# Patient Record
Sex: MALE | Race: BLACK OR AFRICAN AMERICAN
[De-identification: names, ages, dates, MRNs, and addresses within clinical notes are randomized per-mention and may not be internally consistent; named-entity substitution may affect disease eponyms.]

---

## 2017-01-16 ENCOUNTER — HOSPITAL ENCOUNTER (EMERGENCY)
Dept: HOSPITAL 10 - E/R | Age: 77
Discharge: HOME | End: 2017-01-16
Payer: MEDICARE

## 2017-01-16 VITALS
BODY MASS INDEX: 28.41 KG/M2 | WEIGHT: 198.42 LBS | HEIGHT: 70 IN | BODY MASS INDEX: 28.41 KG/M2 | HEIGHT: 70 IN | WEIGHT: 198.42 LBS

## 2017-01-16 VITALS
TEMPERATURE: 98.6 F | HEART RATE: 77 BPM | DIASTOLIC BLOOD PRESSURE: 79 MMHG | RESPIRATION RATE: 19 BRPM | SYSTOLIC BLOOD PRESSURE: 125 MMHG

## 2017-01-16 DIAGNOSIS — M54.6: Primary | ICD-10-CM

## 2017-01-16 DIAGNOSIS — Z99.2: ICD-10-CM

## 2017-01-16 DIAGNOSIS — E03.9: ICD-10-CM

## 2017-01-16 DIAGNOSIS — N18.9: ICD-10-CM

## 2017-01-16 DIAGNOSIS — D64.9: ICD-10-CM

## 2017-01-16 DIAGNOSIS — J20.9: ICD-10-CM

## 2017-01-16 DIAGNOSIS — Z79.82: ICD-10-CM

## 2017-01-16 DIAGNOSIS — E11.9: ICD-10-CM

## 2017-01-16 DIAGNOSIS — Z79.84: ICD-10-CM

## 2017-01-16 LAB
ANION GAP SERPL CALC-SCNC: 17 MMOL/L
BASOPHILS # BLD AUTO: 0 10^3/UL
BASOPHILS NFR BLD: 0.7 %
BUN SERPL-MCNC: 58 MG/DL
CALCIUM SERPL-MCNC: 6.8 MG/DL
CHLORIDE SERPL-SCNC: 97 MMOL/L
CO2 SERPL-SCNC: 31 MMOL/L
CONDITION: 1
CREAT SERPL-MCNC: 10.78 MG/DL
EOSINOPHIL # BLD: 0.2 10^3/UL
EOSINOPHIL NFR BLD: 2.3 %
ERYTHROCYTE [DISTWIDTH] IN BLOOD BY AUTOMATED COUNT: 15.6 %
GLUCOSE SERPL-MCNC: 112 MG/DL
HCT VFR BLD CALC: 32.8 %
HGB BLD-MCNC: 11.1 G/DL
LH ANALYZER COMMENTS: 1
LYMPHOCYTES # BLD AUTO: 1.9 10^3/UL
LYMPHOCYTES NFR BLD AUTO: 25.8 %
MCH RBC QN AUTO: 29.7 PG
MCHC RBC AUTO-ENTMCNC: 33.9 G/DL
MCV RBC AUTO: 87.5 FL
MONOCYTES # BLD: 0.4 10^3/UL
MONOCYTES NFR BLD: 5.9 %
NEUTROPHILS # BLD: 4.8 10^3/UL
NEUTROPHILS NFR BLD AUTO: 65.3 %
NRBC # BLD MANUAL: 0 10^3/UL
NRBC BLD QL: 0 /100WBC
PLATELET # BLD: 203 10^3/UL
PMV BLD AUTO: 8.3 FL
POTASSIUM SERPL-SCNC: 3.7 MMOL/L
RBC # BLD AUTO: 3.75 10^6/UL
SODIUM SERPL-SCNC: 141 MMOL/L
WBC # BLD AUTO: 7.4 10^3/UL
WBC # BLD: 7.4 10^3/UL

## 2017-01-16 PROCEDURE — 80048 BASIC METABOLIC PNL TOTAL CA: CPT

## 2017-01-16 PROCEDURE — 85025 COMPLETE CBC W/AUTO DIFF WBC: CPT

## 2017-01-16 PROCEDURE — 93005 ELECTROCARDIOGRAM TRACING: CPT

## 2017-01-16 PROCEDURE — 71010: CPT

## 2017-01-16 NOTE — ERA
ER Documentation


Chief Complaint


Date/Time


DATE: 17 


TIME: 15:20


Chief Complaint


UPPER BACK PAIN, COUGH CONGESTION DIALYSIS PT  UNABLE TO SLEEP X2 DAYS





HPI


The patient is a 76-year-old male, presenting to the ER because of about back 

pain due to coughing for the last week.  He has been getting over a cold, 

complains of intermittent dry cough for the last 2 weeks.  The back pain is 

worse with coughing.  He denies fever, chills, neck pain, chest pain, dyspnea, 

abdominal pain, vomiting, dysuria, diarrhea.  He does not smoke, drink





Past medical history: Chronic kidney disease on peritoneal dialysis, 

hypothyroidism, diabetes mellitus, anemia


Past surgical history: Appendectomy, cardiac angiogram, peritoneal dialysis 

catheter





ROS


All systems reviewed and are negative except as per history of present illness.





Medications


Home Meds


Active Scripts


Dextromethorphan Hb-Promethazine Hcl (Promethazine DM Syrup) 473 Ml Syrup, 10 

ML PO Q6H Y for COUGH, #4 OZ


   Prov:GREG DUMONT MD         17


Hydrocodone/Acetaminophen (Norco 5-325 Tablet) 1 Each Tablet, 1 TAB PO Q6H Y 

for PAIN, #7 TAB


   Prov:GREG DUMONT MD         17


Azithromycin* (Zithromax*) 250 Mg Tablet, 250 MG PO .ZPACK AS DIRECTED, #6 TAB


   TAKE 500 MG (2 TABS) THE FIRST DAY THEN 250 MG (1 TAB) DAYS 2-5


   Prov:GREG DUMONT MD         17


Reported Medications


[Magnesium ]   No Conflict Check, 250 MG PO DAILY


   17


Aspirin* (Aspirin* EC) 81 Mg Tablet.dr, 81 MG PO DAILY, TAB


   17


Metoprolol Tartrate* (Lopressor*) 25 Mg Tab, 25 MG PO BID, #60 TAB


   17


Docusate Sodium* (Docusate Sodium*) 100 Mg Capsule, 100 MG PO BID, #60 CAP


   17


Ferrous Sulfate* (Ferrous Sulfate*) 325 Mg Tabec, 325 MG PO DAILY, TAB


   17


Omega-3 Fatty Acids/Fish Oil (Fish Oil 1,000 mg Capsule) 1 Each Capsule, 1 EACH 

PO DAILY, CAP


   17


Levothyroxine Sodium* (Levoxyl*) 25 Mcg Tablet, 25 MCG PO BEFORE BREAKFAST, #30 

TAB


   3/10/16


Sevelamer Carbonate* (Renvela*) 800 Mg Tablet, 0.8 GM PO WITH MEALS, TAB


   3/10/16


Clopidogrel Bisulfate (Clopidogrel) 75 Mg Tablet, 75 MG PO DAILY, #30 TAB


   3/10/16


Discontinued Reported Medications


Multivit/Ca Carb/B Cmplx/Fa* (Sri-Michael*) 1 Tab Tab, 1 TAB PO DAILY, TAB


   3/10/16


Calcitriol* (Calcitriol*) 0.5 Mcg Capsule, 0.5 MCG PO DAILY, CAP


   3/10/16


Glipizide XL* (Glipizide XL*) 2.5 Mg Tab.er.24, 2.5 MG PO EVERY OTHER DAY, TAB


   3/10/16





Allergies


Allergies:  


Coded Allergies:  


     penicillin (Verified  Allergy, Unknown, 17)





PMhx/Soc


History of Surgery:  Yes (APPENDICITIS )


Anesthesia Reaction:  No


Hx Substance Use:  No


Hx Tobacco Use:  No





Physical Exam


Vitals





Vital Signs








  Date Time  Temp Pulse Resp B/P Pulse Ox O2 Delivery O2 Flow Rate FiO2


 


17 13:09 97.9 76 20 143/90 99   








Physical Exam


 Const:      No acute distress.


 Head:        Atraumatic.


 Eyes:       Normal Conjunctiva.


 ENT:         Normal External Ears, Nose and Mouth.


 Neck:        Full range of motion.  No meningismus.


 Resp:         Clear to auscultation bilaterally.


 Cardio:       Regular rate and rhythm, no murmurs.


 Abd:         Soft,  non distended, normal bowel sounds, non tender.


 Skin:         No petechiae or rashes.


 Back:        No midline or flank tenderness.


 Ext:          No cyanosis, or edema.


 Neur:        Awake and alert. No focal deficit


 Psych:        Normal Mood and Affect.


Result Diagram:  


17 1551                                                                   

             17 1551





Results 24 hrs





 Laboratory Tests








Test


  17


15:51


 


Anion Gap 17 


 


Basophils # 0.010^3/ul 


 


Basophils % 0.7% 


 


Blood Morphology Comment  


 


Blood Urea Nitrogen 58mg/dl 


 


Calcium Level 6.8mg/dl 


 


Carbon Dioxide Level 31mmol/L 


 


Chloride Level 97mmol/L 


 


Creatinine 10.78mg/dl 


 


Eosinophils # 0.210^3/ul 


 


Eosinophils % 2.3% 


 


Glucose Level 112mg/dl 


 


Hematocrit 32.8% 


 


Hemoglobin 11.1g/dl 


 


Lymphocytes # 1.910^3/ul 


 


Lymphocytes % 25.8% 


 


Mean Corpuscular Hemoglobin 29.7pg 


 


Mean Corpuscular Hemoglobin


Concent 33.9g/dl 


 


 


Mean Corpuscular Volume 87.5fl 


 


Mean Platelet Volume 8.3fl 


 


Monocytes # 0.410^3/ul 


 


Monocytes % 5.9% 


 


Neutrophils # 4.810^3/ul 


 


Neutrophils % 65.3% 


 


Nucleated Red Blood Cells # 0.010^3/ul 


 


Nucleated Red Blood Cells % 0.0/100WBC 


 


Platelet Count 31446^3/UL 


 


Potassium Level 3.7mmol/L 


 


Red Blood Count 3.7510^6/ul 


 


Red Cell Distribution Width 15.6% 


 


Sodium Level 141mmol/L 


 


White Blood Count 7.410^3/ul 








 Current Medications








 Medications


  (Trade)  Dose


 Ordered  Sig/Kt


 Route


 PRN Reason  Start Time


 Stop Time Status Last Admin


Dose Admin


 


 Acetaminophen/


 Hydrocodone Bitart


  (Norco (10/325))  1 tab  ONCE  ONCE


 PO


   17 16:00


 17 16:01 DC 17 16:00


 


 


 Ondansetron HCl


  (Zofran Odt)  4 mg  ONCE  STAT


 ODT


   17 15:31


 17 15:34 DC 17 16:00


 











Procedures/[place] [id number]


 Radiology Main Line: [phone]





 DIAGNOSTIC IMAGING REPORT





 Patient: CLAUDIO BOB   : 1940   Age: 76  Sex: M                     

   


 MR #:    [id number]   Acct #:   [id number]    DOS: 17 1531


 Ordering MD: GREG DUMONT MD   Location:  E/R   Room/Bed:                  

                          


 








PROCEDURE:   XR Chest. 


 


CLINICAL INDICATION:   Chest pain


 


TECHNIQUE:   Single frontal view  of the chest was obtained. 


 


COMPARISON:   03/10/2016 


 


FINDINGS:


The heart is within normal limits.  The thoracic aorta is calcified.


The lungs are clear.


There is no pleural effusion or pneumothorax.   


Again seen is a cardiac monitoring device overlying the left mid chest.


 


RPTAT: AA


 


IMPRESSION:


No acute disease. 


Calcified aorta consistent with atherosclerotic disease.


_____________________________________________ 


.Freddy Orozco MD, MD           Date    Time 


Electronically viewed and signed by .Freddy Orozco MD, MD on 2017 15:

58 


 


D:  2017 15:58  T:  2017 15:58


.S/





CC: GREG DUMONT MD





MEDICAL MAKING DECISION: The patient is a 76-year-old male, presenting with 

acute bronchitis, acute upper back pain most likely due to persistent dry 

cough.  He was treated with Norco 10 million p.o. for pain and Zofran ODT for 

nausea with good response.  The differential diagnoses considered include but 

are not limited to acute coronary syndrome, acute myocardial infarction, 

pericarditis, pulmonary embolism, aortic dissection, pneumonia, pleural effusion

, pneumothorax, GERD, chest wall pain.





Departure


Diagnosis:  


 Primary Impression:  


 Back pain


 Additional Impressions:  


 Bronchitis


 Anemia


Condition:  Stable


Comments


He was discharged with Zithromax, Norco, promethazine with dextromethorphan


I discussed the findings with the patient. I advised the patient to follow-up 

with the primary physician in about 1-2 days, sooner if needed and return if 

any concern.





The patient's blood pressure was elevated (>120/80) but appears stable without 

evidence of hypertension emergency or urgency.  The patient was counseled about 

the risks of hypertension and urged to pursue outpatient monitoring and therapy 

within a week with their primary care physician.











GREG DUMONT MD 2017 15:21

## 2017-01-16 NOTE — RADRPT
PROCEDURE:   XR Chest. 

 

CLINICAL INDICATION:   Chest pain

 

TECHNIQUE:   Single frontal view  of the chest was obtained. 

 

COMPARISON:   03/10/2016 

 

FINDINGS:

The heart is within normal limits.  The thoracic aorta is calcified.

The lungs are clear.

There is no pleural effusion or pneumothorax.   

Again seen is a cardiac monitoring device overlying the left mid chest.

 

RPTAT: AA

 

IMPRESSION:

No acute disease. 

Calcified aorta consistent with atherosclerotic disease.

_____________________________________________ 

.Freddy Orozco MD, MD           Date    Time 

Electronically viewed and signed by .Freddy Orozco MD, MD on 01/16/2017 15:58 

 

D:  01/16/2017 15:58  T:  01/16/2017 15:58

.S/

## 2017-08-07 ENCOUNTER — HOSPITAL ENCOUNTER (EMERGENCY)
Dept: HOSPITAL 10 - E/R | Age: 77
LOS: 1 days | Discharge: HOME | End: 2017-08-08
Payer: MEDICARE

## 2017-08-07 VITALS
BODY MASS INDEX: 26.58 KG/M2 | HEIGHT: 72 IN | HEIGHT: 72 IN | BODY MASS INDEX: 26.58 KG/M2 | WEIGHT: 196.21 LBS | WEIGHT: 196.21 LBS

## 2017-08-07 DIAGNOSIS — K63.1: Primary | ICD-10-CM

## 2017-08-07 DIAGNOSIS — Z79.82: ICD-10-CM

## 2017-08-07 DIAGNOSIS — S29.9XXA: ICD-10-CM

## 2017-08-07 DIAGNOSIS — S20.211A: ICD-10-CM
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
## 2017-08-07 DIAGNOSIS — S70.02XA: ICD-10-CM

## 2017-08-07 LAB
ALBUMIN SERPL-MCNC: 3.7 G/DL (ref 3.3–4.9)
ALBUMIN/GLOB SERPL: 0.97 {RATIO}
ALP SERPL-CCNC: 111 IU/L (ref 42–121)
ALT SERPL-CCNC: 26 IU/L (ref 13–69)
ANION GAP SERPL CALC-SCNC: 21 MMOL/L (ref 8–16)
AST SERPL-CCNC: 22 IU/L (ref 15–46)
BASOPHILS # BLD AUTO: 0.1 10^3/UL (ref 0–0.1)
BASOPHILS NFR BLD: 0.7 % (ref 0–2)
BILIRUB DIRECT SERPL-MCNC: 0 MG/DL (ref 0–0.2)
BILIRUB SERPL-MCNC: 0 MG/DL (ref 0.2–1.3)
BUN SERPL-MCNC: 65 MG/DL (ref 7–20)
CALCIUM SERPL-MCNC: 8 MG/DL (ref 8.4–10.2)
CHLORIDE SERPL-SCNC: 95 MMOL/L (ref 97–110)
CO2 SERPL-SCNC: 29 MMOL/L (ref 21–31)
CREAT SERPL-MCNC: 14.51 MG/DL (ref 0.61–1.24)
EOSINOPHIL # BLD: 0.2 10^3/UL (ref 0–0.5)
EOSINOPHIL NFR BLD: 3.3 % (ref 0–7)
ERYTHROCYTE [DISTWIDTH] IN BLOOD BY AUTOMATED COUNT: 15 % (ref 11.5–14.5)
GLOBULIN SER-MCNC: 3.8 G/DL (ref 1.3–3.2)
GLUCOSE SERPL-MCNC: 105 MG/DL (ref 70–220)
HCT VFR BLD CALC: 32.9 % (ref 42–52)
HGB BLD-MCNC: 10.9 G/DL (ref 14–18)
INR PPP: 0.95
LYMPHOCYTES # BLD AUTO: 2.3 10^3/UL (ref 0.8–2.9)
LYMPHOCYTES NFR BLD AUTO: 32.4 % (ref 15–51)
MCH RBC QN AUTO: 30.4 PG (ref 29–33)
MCHC RBC AUTO-ENTMCNC: 33.1 G/DL (ref 32–37)
MCV RBC AUTO: 91.6 FL (ref 82–101)
MONOCYTES # BLD: 0.5 10^3/UL (ref 0.3–0.9)
MONOCYTES NFR BLD: 7.6 % (ref 0–11)
NEUTROPHILS # BLD: 3.9 10^3/UL (ref 1.6–7.5)
NEUTROPHILS NFR BLD AUTO: 55.7 % (ref 39–77)
NRBC # BLD MANUAL: 0 10^3/UL (ref 0–0)
NRBC BLD AUTO-RTO: 0 /100WBC (ref 0–0)
PLATELET # BLD: 171 10^3/UL (ref 140–415)
PMV BLD AUTO: 10.2 FL (ref 7.4–10.4)
POTASSIUM SERPL-SCNC: 3.6 MMOL/L (ref 3.5–5.1)
PROT SERPL-MCNC: 7.5 G/DL (ref 6.1–8.1)
PROTHROMBIN TIME: 12.7 SEC (ref 12.2–14.2)
PT RATIO: 1
RBC # BLD AUTO: 3.59 10^6/UL (ref 4.7–6.1)
SODIUM SERPL-SCNC: 141 MMOL/L (ref 135–144)
WBC # BLD AUTO: 6.9 10^3/UL (ref 4.8–10.8)

## 2017-08-07 PROCEDURE — 73510: CPT

## 2017-08-07 PROCEDURE — 72125 CT NECK SPINE W/O DYE: CPT

## 2017-08-07 PROCEDURE — 72128 CT CHEST SPINE W/O DYE: CPT

## 2017-08-07 PROCEDURE — 36415 COLL VENOUS BLD VENIPUNCTURE: CPT

## 2017-08-07 PROCEDURE — 96374 THER/PROPH/DIAG INJ IV PUSH: CPT

## 2017-08-07 PROCEDURE — 74177 CT ABD & PELVIS W/CONTRAST: CPT

## 2017-08-07 PROCEDURE — 83605 ASSAY OF LACTIC ACID: CPT

## 2017-08-07 PROCEDURE — 80053 COMPREHEN METABOLIC PANEL: CPT

## 2017-08-07 PROCEDURE — 71100 X-RAY EXAM RIBS UNI 2 VIEWS: CPT

## 2017-08-07 PROCEDURE — 85610 PROTHROMBIN TIME: CPT

## 2017-08-07 PROCEDURE — 96375 TX/PRO/DX INJ NEW DRUG ADDON: CPT

## 2017-08-07 PROCEDURE — 99285 EMERGENCY DEPT VISIT HI MDM: CPT

## 2017-08-07 PROCEDURE — 71020: CPT

## 2017-08-07 PROCEDURE — 85025 COMPLETE CBC W/AUTO DIFF WBC: CPT
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
## 2017-08-08 VITALS — RESPIRATION RATE: 18 BRPM | SYSTOLIC BLOOD PRESSURE: 104 MMHG | DIASTOLIC BLOOD PRESSURE: 70 MMHG | HEART RATE: 78 BPM
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
## 2017-10-01 ENCOUNTER — HOSPITAL ENCOUNTER (EMERGENCY)
Dept: HOSPITAL 10 - E/R | Age: 77
Discharge: HOME | End: 2017-10-01
Payer: COMMERCIAL

## 2017-10-01 VITALS
WEIGHT: 315 LBS | BODY MASS INDEX: 45.1 KG/M2 | HEIGHT: 70 IN | HEIGHT: 70 IN | WEIGHT: 315 LBS | BODY MASS INDEX: 45.1 KG/M2

## 2017-10-01 DIAGNOSIS — E87.6: ICD-10-CM

## 2017-10-01 DIAGNOSIS — Z79.82: ICD-10-CM

## 2017-10-01 DIAGNOSIS — I95.3: Primary | ICD-10-CM

## 2017-10-01 LAB
ANION GAP SERPL CALC-SCNC: 12 MMOL/L (ref 8–16)
BASOPHILS # BLD AUTO: 0 10^3/UL (ref 0–0.1)
BASOPHILS NFR BLD: 0.5 % (ref 0–2)
BUN SERPL-MCNC: 42 MG/DL (ref 7–20)
CALCIUM SERPL-MCNC: 8 MG/DL (ref 8.4–10.2)
CHLORIDE SERPL-SCNC: 98 MMOL/L (ref 97–110)
CO2 SERPL-SCNC: 30 MMOL/L (ref 21–31)
CREAT SERPL-MCNC: 12.49 MG/DL (ref 0.61–1.24)
EOSINOPHIL # BLD: 0.2 10^3/UL (ref 0–0.5)
EOSINOPHIL NFR BLD: 3.1 % (ref 0–7)
ERYTHROCYTE [DISTWIDTH] IN BLOOD BY AUTOMATED COUNT: 14.8 % (ref 11.5–14.5)
GLUCOSE SERPL-MCNC: 140 MG/DL (ref 70–220)
HCT VFR BLD CALC: 37.1 % (ref 42–52)
HGB BLD-MCNC: 11.7 G/DL (ref 14–18)
LYMPHOCYTES # BLD AUTO: 1.8 10^3/UL (ref 0.8–2.9)
LYMPHOCYTES NFR BLD AUTO: 31.3 % (ref 15–51)
MCH RBC QN AUTO: 28.8 PG (ref 29–33)
MCHC RBC AUTO-ENTMCNC: 31.5 G/DL (ref 32–37)
MCV RBC AUTO: 91.4 FL (ref 82–101)
MONOCYTES # BLD: 0.4 10^3/UL (ref 0.3–0.9)
MONOCYTES NFR BLD: 7.7 % (ref 0–11)
NEUTROPHILS # BLD: 3.3 10^3/UL (ref 1.6–7.5)
NEUTROPHILS NFR BLD AUTO: 57.1 % (ref 39–77)
NRBC # BLD MANUAL: 0 10^3/UL (ref 0–0)
NRBC BLD AUTO-RTO: 0 /100WBC (ref 0–0)
PLATELET # BLD: 180 10^3/UL (ref 140–415)
PMV BLD AUTO: 10.3 FL (ref 7.4–10.4)
POTASSIUM SERPL-SCNC: 2.8 MMOL/L (ref 3.5–5.1)
RBC # BLD AUTO: 4.06 10^6/UL (ref 4.7–6.1)
SODIUM SERPL-SCNC: 137 MMOL/L (ref 135–144)
TROPONIN I SERPL-MCNC: 0.05 NG/ML (ref 0–0.12)
WBC # BLD AUTO: 5.8 10^3/UL (ref 4.8–10.8)

## 2017-10-01 PROCEDURE — 99284 EMERGENCY DEPT VISIT MOD MDM: CPT

## 2017-10-01 PROCEDURE — 36415 COLL VENOUS BLD VENIPUNCTURE: CPT

## 2017-10-01 PROCEDURE — 93005 ELECTROCARDIOGRAM TRACING: CPT

## 2017-10-01 PROCEDURE — 85025 COMPLETE CBC W/AUTO DIFF WBC: CPT

## 2017-10-01 PROCEDURE — 80048 BASIC METABOLIC PNL TOTAL CA: CPT

## 2017-10-01 PROCEDURE — 84484 ASSAY OF TROPONIN QUANT: CPT

## 2017-10-01 NOTE — ERD
ER Documentation


Chief Complaint


Date/Time


DATE: 10/1/17 


TIME: 16:45


Chief Complaint


weakness after dialysis





HPI


This is 77-year-old male who does peritoneal dialysis.  The patient says he 

unhooked himself from dialysis this morning and got dressed and went to [facility] 

for breakfast.  He said when he was walking from the car to [facility] he felt a 

little dizzy and it progressively got worse where he felt just generalized 

weakness.  He said he had a lean against the counter to hold himself up and 

that he gently let himself onto the floor.  There is no syncope no chest pain 

no shortness of breath no abdominal pain.  The patient states he has had this 

on a few episodes before.  EMS arrived into the patient's blood pressure was 

low in the upper 90s systolic.  The patient says that he has had some blood 

pressure issues after dialysis in the past.  He says he feels much better and 

wants to go home now.  I did convince him to stay and check a few labs.  He 

also states she has had low potassium in the past





ROS


All systems reviewed and are negative except as per history of present illness.





Medications


Home Meds


Active Scripts


Potassium Chloride* (K-Dur*) 10 Meq Tab.prt.sr, 20 MEQ PO DAILY for 5 Days, #10 

TAB


   Prov:VINCENZO WYATT DO         10/1/17


Hydrocodone/Acetaminophen (Norco  Tablet) 1 Each Tablet, 1 TAB PO Q6H Y 

for PAIN, #20 TAB


   Prov:PAIGE LARSON         8/8/17


Azithromycin* (Zithromax*) 250 Mg Tablet, 250 MG PO .ZPACK AS DIRECTED, #6 TAB


   TAKE 500 MG (2 TABS) THE FIRST DAY THEN 250 MG (1 TAB) DAYS 2-5


   Prov:GREG DUMONT MD         1/16/17


Reported Medications


[Magnesium ]   No Conflict Check, 250 MG PO DAILY


   1/16/17


Aspirin* (Aspirin* EC) 81 Mg Tablet.dr, 81 MG PO DAILY, TAB


   1/16/17


Metoprolol Tartrate* (Lopressor*) 25 Mg Tab, 25 MG PO BID, #60 TAB


   1/16/17


Docusate Sodium* (Docusate Sodium*) 100 Mg Capsule, 100 MG PO BID, #60 CAP


   1/16/17


Ferrous Sulfate* (Ferrous Sulfate*) 325 Mg Tabec, 325 MG PO DAILY, TAB


   1/16/17


Omega-3 Fatty Acids/Fish Oil (Fish Oil 1,000 mg Capsule) 1 Each Capsule, 1 EACH 

PO DAILY, CAP


   1/16/17


Levothyroxine Sodium* (Levoxyl*) 25 Mcg Tablet, 25 MCG PO BEFORE BREAKFAST, #30 

TAB


   3/10/16


Sevelamer Carbonate* (Renvela*) 800 Mg Tablet, 0.8 GM PO WITH MEALS, TAB


   3/10/16


Clopidogrel Bisulfate (Clopidogrel) 75 Mg Tablet, 75 MG PO DAILY, #30 TAB


   3/10/16





Allergies


Allergies:  


Coded Allergies:  


     penicillin (Verified  Allergy, Unknown, 1/16/17)





PMhx/Soc


History of Surgery:  Yes (APPENDICITIS)


Anesthesia Reaction:  No


Hx Miscellaneous Medical Probl:  Yes (PERITONEAL DIALYSIS)


Hx Alcohol Use:  No


Hx Substance Use:  No


Hx Tobacco Use:  No


Smoking Status:  Unknown if ever smoked





FmHx


Family History:  No coronary disease





Physical Exam


Vitals





Vital Signs








  Date Time  Temp Pulse Resp B/P Pulse Ox O2 Delivery O2 Flow Rate FiO2


 


10/1/17 14:50 98.0 78 18 120/71 99   


 


10/1/17 14:50 97.5 81 18 112/70 99   








Physical Exam


Const:      Well-developed, well-nourished


 Head:        Atraumatic, normocephalic


 Eyes:       Normal Conjunctiva, PERRLA, EOMI, normal sclera, no nystagmus


 ENT:         Normal External Ears, Nose and Mouth, moist mucus membranes.


 Neck:        Full range of motion.  No meningismus, no lymphadenopathy.


 Resp:         Clear to auscultation bilaterally, no wheezing, rhonchi, rales


 Cardio:       Regular rate and rhythm, no murmurs, S1 S2 present


 Abd:         Soft, non tender x 4, non distended. Normal bowel sounds, no 

guarding or rebound, no pulsitile abdominal masses or bruits


 Skin:         No petechiae or rashes, no ecchymosis , no maculopapular rash


 Back:        No midline or flank tenderness


 Ext:          No cyanosis, or edema, FROM x 4, normal inspection, 

neurovascularly intact x 4


 Neur:        Awake and alert, STR 5/5 x 4, sensation intact x 4, no focal 

findings, cerebellum intact


 Psych:        Normal Mood and Affect


Result Diagram:  


10/1/17 1450                                                                   

             10/1/17 1450





Results 24 hrs





 Laboratory Tests








Test


  10/1/17


14:50


 


White Blood Count 5.810^3/ul 


 


Red Blood Count 4.0610^6/ul 


 


Hemoglobin 11.7g/dl 


 


Hematocrit 37.1% 


 


Mean Corpuscular Volume 91.4fl 


 


Mean Corpuscular Hemoglobin 28.8pg 


 


Mean Corpuscular Hemoglobin


Concent 31.5g/dl 


 


 


Red Cell Distribution Width 14.8% 


 


Platelet Count 66114^3/UL 


 


Mean Platelet Volume 10.3fl 


 


Neutrophils % 57.1% 


 


Lymphocytes % 31.3% 


 


Monocytes % 7.7% 


 


Eosinophils % 3.1% 


 


Basophils % 0.5% 


 


Nucleated Red Blood Cells % 0.0/100WBC 


 


Neutrophils # 3.310^3/ul 


 


Lymphocytes # 1.810^3/ul 


 


Monocytes # 0.410^3/ul 


 


Eosinophils # 0.210^3/ul 


 


Basophils # 0.010^3/ul 


 


Nucleated Red Blood Cells # 0.010^3/ul 


 


Sodium Level 137mmol/L 


 


Potassium Level 2.8mmol/L 


 


Chloride Level 98mmol/L 


 


Carbon Dioxide Level 30mmol/L 


 


Anion Gap 12 


 


Blood Urea Nitrogen 42mg/dl 


 


Creatinine 12.49mg/dl 


 


Glucose Level 140mg/dl 


 


Calcium Level 8.0mg/dl 


 


Troponin I 0.053ng/ml 








 Current Medications








 Medications


  (Trade)  Dose


 Ordered  Sig/Kt


 Route


 PRN Reason  Start Time


 Stop Time Status Last Admin


Dose Admin


 


 Sodium Chloride


  (NS)  1,000 ml @ 


 1,000 mls/hr  Q1H STAT


 IV


   10/1/17 14:48


 10/1/17 15:47 DC 10/1/17 14:55


 


 


 Potassium Chloride


  (Klor-Con 20)  40 meq  ONCE  STAT


 PO


   10/1/17 16:42


 10/1/17 16:43 DC  


 











Procedures/MDM


EKG: 


Rate/Rhythm:             Normal sinus rhythm with a first-degree AV block


QRS, ST, QT:    NORMAL [place], QRS, QT]


Impression:      [abNORMAL EKG]











She received IV fluids.  The patient's blood pressure is 110 systolic.  The 

patient was walked in the ER and had no problems walking around for the 

dizziness completely asymptomatic.





He received 40 mEq of K-Dur.





I will give him some potassium to go home with.  His potassium should 

reregulate during dialysis.





I feel like his blood pressure was dropped after dialysis related complication 

however is now resolved.  He is feeling better.  We will discharge him home 

with follow-up with his primary





Departure


Diagnosis:  


 Primary Impression:  


 Hypotension


 Hypotension type:  hemodialysis-associated hypotension  Qualified Code:  I95.3 

- Hemodialysis-associated hypotension


 Additional Impressions:  


 Acute weakness


 Hypokalemia


Condition:  Stable


Patient Instructions:  Hypokalemia, Hypotension, All Causes, Weakness, Unk Cause











VINCENZO WYATT DO Oct 1, 2017 16:50

## 2019-06-21 ENCOUNTER — HOSPITAL ENCOUNTER (OUTPATIENT)
Dept: HOSPITAL 91 - RAD | Age: 79
Discharge: HOME | End: 2019-06-21
Payer: COMMERCIAL

## 2019-06-21 ENCOUNTER — HOSPITAL ENCOUNTER (OUTPATIENT)
Dept: HOSPITAL 10 - RAD | Age: 79
Discharge: HOME | End: 2019-06-21
Attending: INTERNAL MEDICINE
Payer: COMMERCIAL

## 2019-06-21 DIAGNOSIS — R93.5: Primary | ICD-10-CM

## 2019-06-21 PROCEDURE — 74018 RADEX ABDOMEN 1 VIEW: CPT

## 2019-07-04 ENCOUNTER — HOSPITAL ENCOUNTER (EMERGENCY)
Dept: HOSPITAL 91 - E/R | Age: 79
LOS: 1 days | Discharge: HOME | End: 2019-07-05
Payer: COMMERCIAL

## 2019-07-04 ENCOUNTER — HOSPITAL ENCOUNTER (EMERGENCY)
Dept: HOSPITAL 10 - E/R | Age: 79
LOS: 1 days | Discharge: HOME | End: 2019-07-05
Payer: COMMERCIAL

## 2019-07-04 VITALS
BODY MASS INDEX: 24.69 KG/M2 | WEIGHT: 176.37 LBS | HEIGHT: 71 IN | BODY MASS INDEX: 24.69 KG/M2 | WEIGHT: 176.37 LBS | HEIGHT: 71 IN

## 2019-07-04 DIAGNOSIS — Z79.82: ICD-10-CM

## 2019-07-04 DIAGNOSIS — Z99.2: ICD-10-CM

## 2019-07-04 DIAGNOSIS — R11.2: ICD-10-CM

## 2019-07-04 DIAGNOSIS — R53.1: Primary | ICD-10-CM

## 2019-07-04 DIAGNOSIS — N18.6: ICD-10-CM

## 2019-07-04 DIAGNOSIS — Z79.02: ICD-10-CM

## 2019-07-04 LAB
ADD MAN DIFF?: NO
ALANINE AMINOTRANSFERASE: 16 IU/L (ref 13–69)
ALBUMIN/GLOBULIN RATIO: 0.87
ALBUMIN: 3.4 G/DL (ref 3.3–4.9)
ALKALINE PHOSPHATASE: 65 IU/L (ref 42–121)
ANION GAP: 12 (ref 5–13)
ASPARTATE AMINO TRANSFERASE: 31 IU/L (ref 15–46)
BASOPHIL #: 0 10^3/UL (ref 0–0.1)
BASOPHILS %: 0.6 % (ref 0–2)
BILIRUBIN,DIRECT: 0 MG/DL (ref 0–0.2)
BILIRUBIN,TOTAL: 0.1 MG/DL (ref 0.2–1.3)
BLOOD UREA NITROGEN: 50 MG/DL (ref 7–20)
CALCIUM: 8.1 MG/DL (ref 8.4–10.2)
CARBON DIOXIDE: 27 MMOL/L (ref 21–31)
CHLORIDE: 98 MMOL/L (ref 97–110)
CREATININE: 13.67 MG/DL (ref 0.61–1.24)
EOSINOPHILS #: 0.1 10^3/UL (ref 0–0.5)
EOSINOPHILS %: 1.8 % (ref 0–7)
GLOBULIN: 3.9 G/DL (ref 1.3–3.2)
GLUCOSE: 115 MG/DL (ref 70–220)
HEMATOCRIT: 32.8 % (ref 42–52)
HEMOGLOBIN: 11.4 G/DL (ref 14–18)
IMMATURE GRANS #M: 0.03 10^3/UL (ref 0–0.03)
IMMATURE GRANS % (M): 0.4 % (ref 0–0.43)
LYMPHOCYTES #: 1.1 10^3/UL (ref 0.8–2.9)
LYMPHOCYTES %: 15.7 % (ref 15–51)
MEAN CORPUSCULAR HEMOGLOBIN: 30.1 PG (ref 29–33)
MEAN CORPUSCULAR HGB CONC: 34.8 G/DL (ref 32–37)
MEAN CORPUSCULAR VOLUME: 86.5 FL (ref 82–101)
MEAN PLATELET VOLUME: 10.4 FL (ref 7.4–10.4)
MONOCYTE #: 0.3 10^3/UL (ref 0.3–0.9)
MONOCYTES %: 4.3 % (ref 0–11)
NEUTROPHIL #: 5.2 10^3/UL (ref 1.6–7.5)
NEUTROPHILS %: 77.2 % (ref 39–77)
NUCLEATED RED BLOOD CELLS #: 0 10^3/UL (ref 0–0)
NUCLEATED RED BLOOD CELLS%: 0 /100WBC (ref 0–0)
PLATELET COUNT: 182 10^3/UL (ref 140–415)
POTASSIUM: 3.5 MMOL/L (ref 3.5–5.1)
RED BLOOD COUNT: 3.79 10^6/UL (ref 4.7–6.1)
RED CELL DISTRIBUTION WIDTH: 14.3 % (ref 11.5–14.5)
SODIUM: 137 MMOL/L (ref 135–144)
TOTAL PROTEIN: 7.3 G/DL (ref 6.1–8.1)
TROPONIN-I: 0.04 NG/ML (ref 0–0.12)
WHITE BLOOD COUNT: 6.7 10^3/UL (ref 4.8–10.8)

## 2019-07-04 PROCEDURE — 99284 EMERGENCY DEPT VISIT MOD MDM: CPT

## 2019-07-04 PROCEDURE — 93005 ELECTROCARDIOGRAM TRACING: CPT

## 2019-07-04 PROCEDURE — 80053 COMPREHEN METABOLIC PANEL: CPT

## 2019-07-04 PROCEDURE — 36415 COLL VENOUS BLD VENIPUNCTURE: CPT

## 2019-07-04 PROCEDURE — 84484 ASSAY OF TROPONIN QUANT: CPT

## 2019-07-04 PROCEDURE — 96360 HYDRATION IV INFUSION INIT: CPT

## 2019-07-04 PROCEDURE — 85025 COMPLETE CBC W/AUTO DIFF WBC: CPT

## 2019-07-04 RX ADMIN — LIDOCAINE HYDROCHLORIDE 1 MLS/HR: 10 INJECTION, SOLUTION EPIDURAL; INFILTRATION; INTRACAUDAL; PERINEURAL at 20:08

## 2019-07-04 NOTE — ERD
ER Documentation


Chief Complaint


Chief Complaint





bib ra from home for weakness, post peritoneal dialysis





HPI


78-year-old male with a history of ESRD on peritoneal dialysis presenting by 


ambulance from home for generalized weakness with nausea and vomiting that 


started today.  He states that he did 10 hours of overnight peritoneal dialysis.


 When he woke up he felt well.  However he soon started to feel flushed and weak


with associated nausea and had multiple episodes of nonbloody and nonbilious 


vomiting.  He denies any associated chest pain, shortness of breath, diarrhea, 


abdominal pain.  Patient is concerned as he may have been eating with some 


contaminated utensils recently.  1 week ago he found a bottle of an unidentified


liquid in his cabinet.  He was not sure where this liquid came from so he dumped


it down the sink and states that it was very sticky and most of it did not wash 


away.  He has been washing his dishes and utensils in the setting where he 


notices the residue of this unknown substance.  He is concerned that maybe he 


got poison from the substance.





ROS


All systems reviewed and are negative except as per history of present illness.





Medications


Home Meds


Active Scripts


Potassium Chloride* (K-Dur*) 10 Meq Tab.prt.sr, 20 MEQ PO DAILY for 5 Days, #10 


TAB


   Prov:VINCENZO WYATT DO         10/1/17


Hydrocodone/Acetaminophen (Norco  Tablet) 1 Each Tablet, 1 TAB PO Q6H PRN 


for PAIN, #20 TAB


   Prov:PAIGE LARSON         8/8/17


Azithromycin* (Zithromax*) 250 Mg Tablet, 250 MG PO .ZPACK AS DIRECTED, #6 TAB


   TAKE 500 MG (2 TABS) THE FIRST DAY THEN 250 MG (1 TAB) DAYS 2-5


   Prov:GREG DUMONT MD         1/16/17


Reported Medications


[Magnesium ]   No Conflict Check, 250 MG PO DAILY


   1/16/17


Aspirin* (Aspirin* EC) 81 Mg Tablet.dr, 81 MG PO DAILY, TAB


   1/16/17


Metoprolol Tartrate* (Lopressor*) 25 Mg Tab, 25 MG PO BID, #60 TAB


   1/16/17


Docusate Sodium* (Docusate Sodium*) 100 Mg Capsule, 100 MG PO BID, #60 CAP


   1/16/17


Ferrous Sulfate* (Ferrous Sulfate*) 325 Mg Tabec, 325 MG PO DAILY, TAB


   1/16/17


Omega-3 Fatty Acids/Fish Oil (Fish Oil 1,000 mg Capsule) 1 Each Capsule, 1 EACH 


PO DAILY, CAP


   1/16/17


Levothyroxine Sodium* (Levoxyl*) 25 Mcg Tablet, 25 MCG PO BEFORE BREAKFAST, #30 


TAB


   3/10/16


Sevelamer Carbonate* (Renvela*) 800 Mg Tablet, 0.8 GM PO WITH MEALS, TAB


   3/10/16


Clopidogrel Bisulfate (Clopidogrel) 75 Mg Tablet, 75 MG PO DAILY, #30 TAB


   3/10/16





Allergies


Allergies:  


Coded Allergies:  


     penicillin (Verified  Allergy, Unknown, 1/16/17)





PMhx/Soc


History of Surgery:  Yes (Appendectomy, cardiac recorder implanted in left 


chest)


Anesthesia Reaction:  No


Hx Miscellaneous Medical Probl:  Yes (ESRD on peritoneal dialysis)


Hx Alcohol Use:  No


Hx Substance Use:  No


Hx Tobacco Use:  No


Smoking Status:  Never smoker





FmHx


Family History:  No diabetes





Physical Exam


Vitals





Vital Signs


  Date      Temp  Pulse  Resp  B/P (MAP)   Pulse Ox  O2          O2 Flow    FiO2


Time                                                 Delivery    Rate


    7/5/19  98.0    100    20      148/86       100  Room Air


     00:09                          (106)


    7/4/19  98.6    100    19      147/80       100  Room Air


     18:59                          (102)


    7/4/19  98.6     90    19      145/89       100


     18:59                          (107)





Physical Exam


Const:   No acute distress, well-appearing, nontoxic


Head:   Atraumatic 


Eyes:    Normal Conjunctiva


ENT:    Normal External Ears, Nose and Mouth.


Neck:               Full range of motion. No meningismus.


Resp:   Clear to auscultation bilaterally


Cardio:   Regular rate and rhythm, no murmurs.  2+ distal pulses in all 4 


extremities


Abd:    Peritoneal dialysis catheter site appears clean, dry, intact.  Abdomen 


is soft, non tender, non distended. Normal bowel sounds


Skin:   No petechiae or rashes


Back:   No midline or flank tenderness


Ext:    No cyanosis, or edema


Neur:   Awake and alert, normal speech, no facial asymmetry, moving all 


extremities spontaneously.  Strength grossly intact.


Psych:    Normal Mood and Affect


Result Diagram:  


7/4/19 2004 7/4/19 2004





Results 24 hrs





Laboratory Tests


              Test
                                  7/4/19
20:04


              White Blood Count                      6.7 10^3/ul


              Red Blood Count                       3.79 10^6/ul


              Hemoglobin                               11.4 g/dl


              Hematocrit                                  32.8 %


              Mean Corpuscular Volume                    86.5 fl


              Mean Corpuscular Hemoglobin                30.1 pg


              Mean Corpuscular Hemoglobin
Concent     34.8 g/dl 



              Red Cell Distribution Width                 14.3 %


              Platelet Count                         182 10^3/UL


              Mean Platelet Volume                       10.4 fl


              Immature Granulocytes %                    0.400 %


              Neutrophils %                               77.2 %


              Lymphocytes %                               15.7 %


              Monocytes %                                  4.3 %


              Eosinophils %                                1.8 %


              Basophils %                                  0.6 %


              Nucleated Red Blood Cells %            0.0 /100WBC


              Immature Granulocytes #              0.030 10^3/ul


              Neutrophils #                          5.2 10^3/ul


              Lymphocytes #                          1.1 10^3/ul


              Monocytes #                            0.3 10^3/ul


              Eosinophils #                          0.1 10^3/ul


              Basophils #                            0.0 10^3/ul


              Nucleated Red Blood Cells #            0.0 10^3/ul


              Sodium Level                            137 mmol/L


              Potassium Level                         3.5 mmol/L


              Chloride Level                           98 mmol/L


              Carbon Dioxide Level                     27 mmol/L


              Anion Gap                                       12


              Blood Urea Nitrogen                       50 mg/dl


              Creatinine                             13.67 mg/dl


              Est Glomerular Filtrat Rate
mL/min    mL/min 



              Glucose Level                            115 mg/dl


              Calcium Level                            8.1 mg/dl


              Total Bilirubin                          0.1 mg/dl


              Direct Bilirubin                        0.00 mg/dl


              Indirect Bilirubin                       0.1 mg/dl


              Aspartate Amino Transf
(AST/SGOT)         31 IU/L 



              Alanine Aminotransferase
(ALT/SGPT)       16 IU/L 



              Alkaline Phosphatase                       65 IU/L


              Troponin I                             0.039 ng/ml


              Total Protein                             7.3 g/dl


              Albumin                                   3.4 g/dl


              Globulin                                 3.90 g/dl


              Albumin/Globulin Ratio                        0.87





Current Medications


 Medications
   Dose
          Sig/Kt
       Start Time
   Status  Last


 (Trade)       Ordered        Route
 PRN     Stop Time              Admin
Dose


                              Reason                                Admin


 Sodium         500 ml @ 
     Q1H STAT
      7/4/19        DC            7/4/19


Chloride       500 mls/hr     IV
            19:34
 7/4/19                20:08



                                             20:33








Procedures/MDM


EMERGENT LABS AND DIAGNOSTIC STUDIES: 


Lab Results above were reviewed and interpreted by me. 





CBC: no anemia or evidence of infection


CMP: No evidence of clinically significant electrolyte abnormality, acidosis, 


renal failure, hypoglycemia, liver disease, or biliary obstruction 


Troponin within normal limits, not indicative of cardiac ischemia





12-lead EKG was interpreted by GERI Michelle MD: 


 Sinus tachycardia with ventricular rate of 109 beats per minute 


Normal axis 


Normal intervals 


No acute ST or T wave changes suggestive of acute ischemia or STEMI. 





___________________________________________________________ 


Initial Nursing notes reviewed. 


Previous Medical Records requested via the Electronic Health Record. 





EMERGENCY DEPARTMENT COURSE / MEDICAL DECISION MAKING: 





Patient presented after an episode of feeling weak and vomiting.  However he has


no focal deficits on exam and is afebrile.  Vitals were notable for mild 


tachycardia.  For this reason IV fluids were started.  Patient's exam is 


unremarkable at this time.  Basic labs were done showing evidence of known ESRD,


but no significant electrolyte abnormalities or other significant findings.  I 


have a low suspicion for any type of poisoning in this patient.  The cause of 


his symptoms are unclear at this time.  However the patient has remained stable 


and asymptomatic while here in the ER.  Upon reevaluation, he is feeling much 


better and would like to go home.  I feel like this is a reasonable plan, 


however strict return precautions were given.  Patient advised to follow-up with


his primary care doctor tomorrow.  Family and patient agreeable with discharge 


plan.  Patient discharged in stable condition.








Patient's blood pressure was elevated (>120/80) but appears stable without 


evidence of hypertensive emergency or urgency. The patient was counseled about 


the risks of hypertension and urged to pursue outpatient monitoring and therapy 


within a week with their primary care physician.





Departure


Diagnosis:  


   Primary Impression:  


   Acute weakness


   Additional Impressions:  


   Nausea and vomiting


   Vomiting type:  unspecified  Vomiting Intractability:  non-intractable  


   Qualified Codes:  R11.2 - Nausea with vomiting, unspecified


   ESRD on peritoneal dialysis


Condition:  Stable











EKJELENA FLOWERS MD          Jul 4, 2019 20:12

## 2019-07-05 VITALS — RESPIRATION RATE: 20 BRPM | SYSTOLIC BLOOD PRESSURE: 148 MMHG | HEART RATE: 100 BPM | DIASTOLIC BLOOD PRESSURE: 86 MMHG
